# Patient Record
Sex: MALE | Race: WHITE | NOT HISPANIC OR LATINO | Employment: OTHER | URBAN - METROPOLITAN AREA
[De-identification: names, ages, dates, MRNs, and addresses within clinical notes are randomized per-mention and may not be internally consistent; named-entity substitution may affect disease eponyms.]

---

## 2017-03-09 DIAGNOSIS — N18.30 CHRONIC KIDNEY DISEASE, STAGE III (MODERATE): Primary | ICD-10-CM

## 2017-03-09 RX ORDER — SODIUM BICARBONATE 650 MG/1
650 TABLET ORAL 2 TIMES DAILY
Qty: 60 TABLET | Refills: 4 | Status: SHIPPED | OUTPATIENT
Start: 2017-03-09 | End: 2017-08-23 | Stop reason: SDUPTHER

## 2017-05-23 ENCOUNTER — OFFICE VISIT (OUTPATIENT)
Dept: UROLOGY | Facility: CLINIC | Age: 52
End: 2017-05-23
Payer: MEDICARE

## 2017-05-23 ENCOUNTER — HOSPITAL ENCOUNTER (OUTPATIENT)
Dept: RADIOLOGY | Facility: HOSPITAL | Age: 52
Discharge: HOME | End: 2017-05-23
Attending: UROLOGY
Payer: MEDICARE

## 2017-05-23 VITALS
HEART RATE: 79 BPM | BODY MASS INDEX: 24.64 KG/M2 | HEIGHT: 71 IN | SYSTOLIC BLOOD PRESSURE: 118 MMHG | WEIGHT: 176 LBS | DIASTOLIC BLOOD PRESSURE: 78 MMHG

## 2017-05-23 DIAGNOSIS — B20 HIV (HUMAN IMMUNODEFICIENCY VIRUS INFECTION): ICD-10-CM

## 2017-05-23 DIAGNOSIS — N13.30 HYDRONEPHROSIS DETERMINED BY ULTRASOUND: Primary | ICD-10-CM

## 2017-05-23 DIAGNOSIS — N13.30 HYDRONEPHROSIS DETERMINED BY ULTRASOUND: ICD-10-CM

## 2017-05-23 DIAGNOSIS — N20.0 KIDNEY STONE: ICD-10-CM

## 2017-05-23 DIAGNOSIS — N40.0 BENIGN PROSTATIC HYPERPLASIA, PRESENCE OF LOWER URINARY TRACT SYMPTOMS UNSPECIFIED, UNSPECIFIED MORPHOLOGY: ICD-10-CM

## 2017-05-23 LAB
BILIRUB SERPL-MCNC: NORMAL MG/DL
BLOOD URINE, POC: NORMAL
COLOR, POC UA: YELLOW
GLUCOSE UR QL STRIP: NORMAL
KETONES UR QL STRIP: NORMAL
LEUKOCYTE ESTERASE URINE, POC: NORMAL
NITRITE, POC UA: NORMAL
PH, POC UA: 5
PROTEIN, POC: NORMAL
SPECIFIC GRAVITY, POC UA: 1
UROBILINOGEN, POC UA: NORMAL

## 2017-05-23 PROCEDURE — 81002 URINALYSIS NONAUTO W/O SCOPE: CPT | Mod: S$GLB,,, | Performed by: UROLOGY

## 2017-05-23 PROCEDURE — 99999 PR PBB SHADOW E&M-EST. PATIENT-LVL III: CPT | Mod: PBBFAC,,, | Performed by: UROLOGY

## 2017-05-23 PROCEDURE — 74000 XR ABDOMEN AP 1 VIEW: CPT | Mod: 26,,, | Performed by: RADIOLOGY

## 2017-05-23 PROCEDURE — 3078F DIAST BP <80 MM HG: CPT | Mod: S$GLB,,, | Performed by: UROLOGY

## 2017-05-23 PROCEDURE — 1160F RVW MEDS BY RX/DR IN RCRD: CPT | Mod: S$GLB,,, | Performed by: UROLOGY

## 2017-05-23 PROCEDURE — 99214 OFFICE O/P EST MOD 30 MIN: CPT | Mod: 25,S$GLB,, | Performed by: UROLOGY

## 2017-05-23 PROCEDURE — 3074F SYST BP LT 130 MM HG: CPT | Mod: S$GLB,,, | Performed by: UROLOGY

## 2017-05-23 PROCEDURE — 74000 XR ABDOMEN AP 1 VIEW: CPT | Mod: TC

## 2017-05-23 NOTE — PROGRESS NOTES
"Blasie Spear is a 51 y.o. man who is here for the evaluation of kidney problem.  Seen by me for bilateral hydronephrosis with elevated creatinine on 11/20/15.  He is here for follow up.    Hx of childhood kidney surgery.  Hx of kidney stone disease  Hx of HIV  S/p lower back surgery with right lower back pain.  Referred for hydronephrosis bilateral Nephrolithiasis initially.  I evaluated him with MAG 3 renal scan with lasix, which showed no high grade obstruction, and 55 % function on the left kidney and 45 % function on the right kidney.   c/o flank discomfort, takes tramadol for relief  He was evaluated at [facility] in the past for hydronephrosis with negative work up within last 2 years.  Denies hematuria .    He works at [employer] in [employer] ( goes by "Flavio")    Past Medical History:   Diagnosis Date    Anticoagulant long-term use     Asthma     Coronary artery disease Stent 2010    GERD (gastroesophageal reflux disease)     Hypertension     Immune deficiency disorder HIV    Renal disorder only one functioning kidney     Past Surgical History:   Procedure Laterality Date    BACK SURGERY  lamenectomy     2013    CHOLECYSTECTOMY  2008    CORONARY STENT PLACEMENT  2010    FRACTURE SURGERY  foot surgery     Social History   Substance Use Topics    Smoking status: Current Some Day Smoker     Packs/day: 0.50     Years: 25.00     Types: Cigarettes    Smokeless tobacco: Not on file    Alcohol use 2.0 oz/week     4 drink(s) per week     Family History   Problem Relation Age of Onset    Adopted: Yes     Allergy:  Allergies   Allergen Reactions    Penicillins Rash     Outpatient Encounter Prescriptions as of 5/23/2017   Medication Sig Dispense Refill    acetaminophen (TYLENOL) 325 MG tablet Take 325 mg by mouth every 6 (six) hours as needed for Pain.      ANDROGEL 20.25 mg/1.25 gram (1.62 %) GlPm       aripiprazole (ABILIFY) 10 MG Tab       aspirin 81 MG Chew Take 81 mg by mouth once daily.      " clobetasol (TEMOVATE) 0.05 % cream       clonazePAM (KLONOPIN) 1 MG tablet       clopidogrel (PLAVIX) 75 mg tablet       ergocalciferol (ERGOCALCIFEROL) 50,000 unit Cap Take 1 capsule (50,000 Units total) by mouth every 7 days. 12 capsule 3    eszopiclone 3 mg Tab       fenofibrate (TRICOR) 48 MG tablet       fluticasone (FLONASE) 50 mcg/actuation nasal spray       furosemide (LASIX) 20 MG tablet       INCRUSE ELLIPTA 62.5 mcg/actuation DsDv       INTELENCE 200 mg Tab tablet       lisinopril (PRINIVIL,ZESTRIL) 2.5 MG tablet       lisinopril (PRINIVIL,ZESTRIL) 20 MG tablet Take 20 mg by mouth once daily.      loperamide (IMODIUM) 2 mg capsule       metoprolol succinate (TOPROL-XL) 100 MG 24 hr tablet Take 1 tablet (100 mg total) by mouth once daily. 30 tablet 11    montelukast (SINGULAIR) 10 mg tablet Take 10 mg by mouth every evening.      nitroGLYCERIN (NITROSTAT) 0.3 MG SL tablet Place 0.3 mg under the tongue every 5 (five) minutes as needed.      ondansetron (ZOFRAN) 8 MG tablet       PROAIR HFA 90 mcg/actuation inhaler       raltegravir (ISENTRESS) 400 mg tablet Take 400 mg by mouth 2 (two) times daily.      ranitidine (ZANTAC) 300 MG tablet       REYATAZ 200 mg Cap       rosuvastatin (CRESTOR) 10 MG tablet Take 10 mg by mouth once daily.      sodium bicarbonate 650 MG tablet Take 1 tablet (650 mg total) by mouth 2 (two) times daily. 60 tablet 4    SPIRIVA WITH HANDIHALER 18 mcg inhalation capsule       SYMBICORT 80-4.5 mcg/actuation HFAA       tizanidine (ZANAFLEX) 4 MG tablet       topiramate (TOPAMAX) 50 MG tablet       tramadol (ULTRAM) 50 mg tablet Take 50 mg by mouth every 6 (six) hours as needed.      trazodone (DESYREL) 50 MG tablet        No facility-administered encounter medications on file as of 5/23/2017.      Review of Systems   General ROS: GENERAL:  No weight gain or loss  SKIN:  No rashes or lacerations  HEAD:  No headaches  EYES:  No exophthalmos, jaundice or  blindness  EARS:  No dizziness, tinnitus or hearing loss  NOSE:  No changes in smell  MOUTH & THROAT:  No dyskinetic movements or obvious goiter  CHEST:  No shortness of breath, hyperventilation or cough  CARDIOVASCULAR:  No tachycardia or chest pain  ABDOMEN:  No nausea, vomiting, pain, constipation or diarrhea  URINARY:  No frequency, dysuria or sexual dysfunction  ENDOCRINE:  No polydipsia, polyuria  MUSCULOSKELETAL:  No pain or stiffness of the joints  NEUROLOGIC:  No weakness, sensory changes, seizures, confusion, memory loss, tremor or other abnormal movements  Physical Exam     Vitals:    05/23/17 1347   BP: 118/78   Pulse: 79     General Appearance:  Alert, cooperative, no distress, appears stated age   Head:  Normocephalic, without obvious abnormality, atraumatic   Eyes:  PERRL, conjunctiva/corneas clear, EOM's intact, fundi benign, both eyes   Ears:  Normal TM's and external ear canals, both ears   Nose: Nares normal, septum midline, mucosa normal, no drainage or sinus tenderness   Throat: Lips, mucosa, and tongue normal; teeth and gums normal   Neck: Supple, symmetrical, trachea midline, no adenopathy, thyroid: not enlarged, symmetric, no tenderness/mass/nodules, no carotid bruit or JVD   Back:   Symmetric, no curvature, ROM normal, no CVA tenderness   Lungs:   Clear to auscultation bilaterally, respirations unlabored   Chest Wall:  No tenderness or deformity   Heart:  Regular rate and rhythm, S1, S2 normal, no murmur, rub or gallop   Abdomen:   Soft, non-tender, bowel sounds active all four quadrants,  no masses, no organomegaly of liver and spleen  No hernia noted   Genitalia:  Scrotum: no rash or lesion  Normal symmetric epididymis without masses  Normal vas palpated  Normal size, symmetric testicles with no masses   Normal urethral meatus with no discharge  Normal circumcised penis with no lesion   Rectal:  Normal perineum and anus upon inspection.  Normal tone, no masses or tenderness;   Extremities:  Extremities normal, atraumatic, no cyanosis or edema   Pulses: 2+ and symmetric   Skin: Skin color, texture, turgor normal, no rashes or lesions   Lymph nodes: Cervical, supraclavicular, and axillary nodes normal   Neurologic: Normal     Prostate deferred.     LABS:  No results found for: PSA, PSADIAG, PSATOTAL, PSAFREE, PSAFREEPCT    Lab Results   Component Value Date    CREATININE 2.1 (H) 06/22/2016    CREATININE 2.1 (H) 06/22/2016    CREATININE 2.2 (H) 10/20/2015     Radiology:  US 11/13/15  Multifocal areas of scarring in both kidneys. A focal area solid tissue is noted in the region surrounded by scar formation, measuring 2.3 x 1.7 x 2.2 cm. While this likely relates to normal renal tissue, an underlying lesion cannot be entirely excluded and further evaluation with contrast-enhanced CT is recommended.    Bilateral hydronephrosis, moderate on the right and mild on the left. Bilateral ureteral jets are noted, favoring against an obstructing process within the ureter.    Bilateral nonobstructing renal calculi.    Sonographic findings suggestive of medical renal disease.    MAG 3 renal scan with lasix 11/30/2015  1. Scintigraphic evidence of urinary retention in the collecting systems . Preserved response to Lasix with no evidence of high-grade obstruction.    2. Differential function left kidney 55%, right kidney 45%.    Assessment and Plan:  Blaise was seen today for nephrolithiasis.    Diagnoses and all orders for this visit:    Hydronephrosis determined by ultrasound  -     Comprehensive metabolic panel; Future  -     US Retroperitoneal Limited; Future  -     X-Ray Abdomen AP 1 View; Future  -     POCT urine dipstick without microscope    Benign prostatic hyperplasia, presence of lower urinary tract symptoms unspecified, unspecified morphology  -     Prostate Specific Antigen, Diagnostic; Future  -     POCT urine dipstick without microscope    HIV (human immunodeficiency virus infection)  -     Comprehensive  metabolic panel; Future    Kidney stone    follow up renal US and BMP now.  If stable, no need for another MAG 3 renal scan with lasix.  But if any change, will arrange further evaluation.  Will add PSA on his blood test.  If all stable, follow up with me in 1 year.    Recommend to follow up with his nephrology.  I spent 25 minutes with the patient of which more than half was spent in direct consultation with the patient in regards to our treatment and plan.    Follow-up:  Return in about 1 year (around 5/23/2018).

## 2017-05-24 ENCOUNTER — PATIENT MESSAGE (OUTPATIENT)
Dept: UROLOGY | Facility: CLINIC | Age: 52
End: 2017-05-24

## 2017-05-24 ENCOUNTER — TELEPHONE (OUTPATIENT)
Dept: UROLOGY | Facility: CLINIC | Age: 52
End: 2017-05-24

## 2017-05-24 DIAGNOSIS — N20.0 KIDNEY STONE: Primary | ICD-10-CM

## 2017-05-24 RX ORDER — TAMSULOSIN HYDROCHLORIDE 0.4 MG/1
0.4 CAPSULE ORAL DAILY
Qty: 30 CAPSULE | Refills: 11 | Status: SHIPPED | OUTPATIENT
Start: 2017-05-24 | End: 2018-05-24

## 2017-05-24 NOTE — TELEPHONE ENCOUNTER
KUB yesterday showed:    Abdomen single view compared to November 2015.  Postop changes in the spine.  Vascular calcifications noted.  Scattered stool and bowel gas.  Approximate 3 mm calcification overlying the left L3 transverse process.      Diagnoses and all orders for this visit:    Kidney stone  -     CT Renal Stone Study ABD Pelvis WO; Future  -     tamsulosin (FLOMAX) 0.4 mg Cp24; Take 1 capsule (0.4 mg total) by mouth once daily.    Called and talked to his partner about the findings.  Will follow up with CT RSS.  I will review the CT and decide his follow up.  Hydration, flomax for now.

## 2017-06-01 ENCOUNTER — HOSPITAL ENCOUNTER (OUTPATIENT)
Dept: RADIOLOGY | Facility: HOSPITAL | Age: 52
Discharge: HOME | End: 2017-06-01
Attending: UROLOGY
Payer: MEDICARE

## 2017-06-01 DIAGNOSIS — N13.30 HYDRONEPHROSIS DETERMINED BY ULTRASOUND: ICD-10-CM

## 2017-06-01 DIAGNOSIS — N20.0 KIDNEY STONE: ICD-10-CM

## 2017-06-01 PROCEDURE — 74176 CT ABD & PELVIS W/O CONTRAST: CPT | Mod: 26,,, | Performed by: RADIOLOGY

## 2017-06-01 PROCEDURE — 76770 US EXAM ABDO BACK WALL COMP: CPT | Mod: 26,,, | Performed by: RADIOLOGY

## 2017-06-01 PROCEDURE — 76770 US EXAM ABDO BACK WALL COMP: CPT | Mod: TC

## 2017-06-01 PROCEDURE — 74176 CT ABD & PELVIS W/O CONTRAST: CPT | Mod: TC

## 2017-06-07 ENCOUNTER — INITIAL CONSULT (OUTPATIENT)
Dept: OPHTHALMOLOGY | Facility: CLINIC | Age: 52
End: 2017-06-07
Payer: MEDICARE

## 2017-06-07 DIAGNOSIS — H04.123 DRY EYE SYNDROME, BILATERAL: Primary | ICD-10-CM

## 2017-06-07 DIAGNOSIS — H52.7 REFRACTIVE ERROR: ICD-10-CM

## 2017-06-07 PROCEDURE — 99999 PR PBB SHADOW E&M-EST. PATIENT-LVL III: CPT | Mod: PBBFAC,,, | Performed by: OPHTHALMOLOGY

## 2017-06-07 PROCEDURE — 92004 COMPRE OPH EXAM NEW PT 1/>: CPT | Mod: S$GLB,,, | Performed by: OPHTHALMOLOGY

## 2017-06-07 NOTE — LETTER
June 9, 2017      Oksana Sin NP  [id number]  [facility] [address]           [facility] - Ophthalmology  [address]  [facility] [address]  Phone: [phone]  Fax: [phone]          Patient: Blaise Spear   MR Number: [id number]   YOB: 1965   Date of Visit: 6/7/2017       Dear Oksana Sin:    Thank you for referring Blaise Spear to me for evaluation. Attached you will find relevant portions of my assessment and plan of care.    If you have questions, please do not hesitate to call me. I look forward to following Blaise Spear along with you.    Sincerely,    Selena Chávez MD    Enclosure  CC:  No Recipients    If you would like to receive this communication electronically, please contact [email or web address] or [phone] to request more information on [employer] Link access.    For providers and/or their staff who would like to refer a patient to [facility], please contact us through our one-stop-shop provider referral line, [facility], at [phone].    If you feel you have received this communication in error or would no longer like to receive these types of communications, please e-mail [email or web address]

## 2017-06-07 NOTE — PROGRESS NOTES
HPI     DLE:  4 years ago for full exam, 2 years ago at [facility]    Pt states burning and tearing OU x 6 months. Pt states the burning and   tearing happens mostly when reading. Pt states he gets headaches often.    Pt states pain OU (8 on scale).  Pt states floaters OU have gotten worse.   Pt thinks he may need new glasses but wants to get the issues he is   currently having resolved first.     Visine PRN OU (pt states these hurt his eyes when he uses them)  Restasis BID OU (pt states these burn his eyes)     Last edited by Bryanna Clark MA on 6/7/2017  3:20 PM. (History)              Assessment /Plan     For exam results, see Encounter Report.    Dry eye syndrome, bilateral    Refractive error      Discontinue Visine, Recommend ATs 3-4 times daily    Referral to Optometry for refraction and glasses update -  for continued care.

## 2017-06-08 ENCOUNTER — OFFICE VISIT (OUTPATIENT)
Dept: OPTOMETRY | Facility: CLINIC | Age: 52
End: 2017-06-08
Payer: MEDICARE

## 2017-06-08 DIAGNOSIS — H52.4 HYPEROPIA WITH ASTIGMATISM AND PRESBYOPIA, BILATERAL: ICD-10-CM

## 2017-06-08 DIAGNOSIS — H52.03 HYPEROPIA WITH ASTIGMATISM AND PRESBYOPIA, BILATERAL: ICD-10-CM

## 2017-06-08 DIAGNOSIS — H53.8 BLURRED VISION, BILATERAL: Primary | ICD-10-CM

## 2017-06-08 DIAGNOSIS — H04.123 BILATERAL DRY EYES: ICD-10-CM

## 2017-06-08 DIAGNOSIS — H52.203 HYPEROPIA WITH ASTIGMATISM AND PRESBYOPIA, BILATERAL: ICD-10-CM

## 2017-06-08 PROCEDURE — 92015 DETERMINE REFRACTIVE STATE: CPT | Mod: S$GLB,,, | Performed by: OPTOMETRIST

## 2017-06-08 PROCEDURE — 92012 INTRM OPH EXAM EST PATIENT: CPT | Mod: S$GLB,,, | Performed by: OPTOMETRIST

## 2017-06-08 PROCEDURE — 99999 PR PBB SHADOW E&M-EST. PATIENT-LVL II: CPT | Mod: PBBFAC,,, | Performed by: OPTOMETRIST

## 2017-06-08 RX ORDER — LORAZEPAM 1 MG/1
TABLET ORAL
COMMUNITY
Start: 2017-05-31

## 2017-06-08 RX ORDER — NITROGLYCERIN 0.4 MG/1
TABLET SUBLINGUAL
COMMUNITY
Start: 2017-04-24

## 2017-06-08 RX ORDER — EMTRICITABINE 200 MG/1
CAPSULE ORAL
COMMUNITY
Start: 2017-06-01

## 2017-06-08 NOTE — PATIENT INSTRUCTIONS
"DRY EYES     Dry eyes is a common condition. It can be caused by an insufficient volume of tears, or by tears that do not spread evenly over the cornea. An uneven tear film can also cause vision to blur intermittently.   Dry eyes are often associated with burning, stinging, and/or red eyes.As we age, the eyes usually produce fewer tears and result in decreased normal eye lubrication. Paradoxically, dry eye can make eyes water. When they water, that watery production actually makes the dry eye situation worse because the watery tears do not lubricate the eye well at all. Watery tears really serve to flush foreign material out of the eye, not to lubricate. Unfortunately, when the eyes get really dry they become irritated and stimulate the formation of watery tears.   Lubricants, in the form of drops or ointments, are the principal treatment for dry eyes. The following are recommendations for managing your dry eye condition:    1) Use over-the-counter artificial tears or lubricants (such as Systane, Refresh, Blink, or Genteal), 1 drop in each eye 3 to 4 times a day. Please avoid drops that advertise redness relief since these can be unhealthy for your eyes and can cause permanent redness if used long-term.     It is best to take artificial tears on a schedule, like you would for a medication. Taking them routinely at breakfast, lunch, and dinner for example will provide better relief and better use of the tear drop than taking them whenever your eyes "feel" dry.    2) Optional use of over-the-counter lubricating gels (such as Genteal Gel, Systane Gel, or Refresh PM) applied to the inside of the lower lid of both eyes at bedtime. This gel is very thick and may cause blurred vision, so we recommend you use it before bedtime. In the morning you can gently wipe your eyes with a damp washcloth to remove any residual gel.    3) Warm compresses applied to the closed eyelids twice per day, followed with lid massage.    4) " Always drink an adequate amount of water daily (4-6 cups a day).    5) 1000 mg of good quality fish oil (labeled DHA and/or EPA). Flaxseed oil can also be beneficial.    6) Use a humidifier in your bedroom.    7) If the dryness continues there may be additional options of punctal plugs and Restasis. Punctal plugs are medical devices inserted into the puncta or the drain of the eye to block some of your natural tears from draining off the eye. Restasis is a prescription eye drop that, over time, may help your body make more tears naturally.    It is important to maintain this treatment plan until your symptoms have improved. Once improved, you can reduce the frequency of lubricants and warm compresses. If the symptoms recur, then apply as needed for comfort.    Bebe Alvares, GAVIN

## 2017-06-08 NOTE — PROGRESS NOTES
HPI     Mr. Blaise Spear was referred by Dr. Chávez for a refraction.    He saw Dr. Chávez yesterday for dry eyes. He is not currently using any   drops, pt says he never used Visine, he has not yet start artificial tears   recommended by Dr. Chávez.  Patient complains of blurry vision all ranges with his current trifocal   lenses.    (-)drops  (-)flashes  (-)floaters  (-)diplopia    Diabetic no  No results found for: HGBA1C    OCULAR HISTORY  Last Eye Exam 06/08/2017  (-)eye surgery   Dry eyes    FAMILY HISTORY  (-)Glaucoma     Last edited by Bebe Alvares, OD on 6/8/2017 12:28 PM. (History)            Assessment /Plan     For exam results, see Encounter Report.    Blurred vision, bilateral   Caused by under-corrected refractive error. See plan below.    Hyperopia with astigmatism and presbyopia, bilateral   Increase in refractive error OU. New glasses prescription released, adaptation expected.    Eyeglass Final Rx     Eyeglass Final Rx       Sphere Cylinder Axis Dist VA Add    Right +1.75 +0.75 180 20/20 +2.00    Left +1.75 Sphere  20/20 +2.00    Expiration Date:  6/9/2018    Comments:  Tint okay.            Bilateral dry eyes   Start artificial tears TID as directed by Dr. Chávez      RTC prn  Next comprehensive eye exam with DFE due in 1 year (June 2018)

## 2017-08-21 ENCOUNTER — TELEPHONE (OUTPATIENT)
Dept: NEUROSURGERY | Facility: CLINIC | Age: 52
End: 2017-08-21

## 2017-08-21 NOTE — TELEPHONE ENCOUNTER
"----- Message from Shannon Cain sent at 8/21/2017 11:40 AM CDT -----  Contact: Anne with [facility] [phone] ext. [phone]  Anne is calling to schedule a new patient appt for "Ongoing lumbar and thoracic pain", patient has recent MRI. Please call   "

## 2017-08-21 NOTE — TELEPHONE ENCOUNTER
SCHEDULED APPT FOR 09/20/2017 AT 1530 AT [facility] WITH INSTRUCTIONS TO BRING MRI DISK AND REPORT TO THE APPT

## 2017-08-23 DIAGNOSIS — N18.30 CHRONIC KIDNEY DISEASE, STAGE III (MODERATE): ICD-10-CM

## 2017-08-23 NOTE — TELEPHONE ENCOUNTER
----- Message from Yaima López sent at 8/23/2017  4:05 PM CDT -----  Contact: [facility]  OUT OF MED    BEEN OUT OF MED --    Electronically  Sent      [facility]   [phone] -        Sodium Bicarb     [facility]      Thanks

## 2017-08-24 RX ORDER — SODIUM BICARBONATE 650 MG/1
650 TABLET ORAL 2 TIMES DAILY
Qty: 60 TABLET | Refills: 4 | Status: SHIPPED | OUTPATIENT
Start: 2017-08-24 | End: 2018-04-26

## 2018-03-14 ENCOUNTER — PATIENT MESSAGE (OUTPATIENT)
Dept: NEPHROLOGY | Facility: CLINIC | Age: 53
End: 2018-03-14

## 2018-03-14 DIAGNOSIS — N18.30 CHRONIC KIDNEY DISEASE, STAGE III (MODERATE): ICD-10-CM

## 2018-03-14 RX ORDER — SODIUM BICARBONATE 650 MG/1
650 TABLET ORAL 2 TIMES DAILY
Qty: 60 TABLET | Refills: 4 | OUTPATIENT
Start: 2018-03-14 | End: 2018-04-13

## 2018-03-14 RX ORDER — ERGOCALCIFEROL 1.25 MG/1
50000 CAPSULE ORAL
Qty: 12 CAPSULE | Refills: 3 | OUTPATIENT
Start: 2018-03-14

## 2018-03-15 DIAGNOSIS — N18.30 CHRONIC KIDNEY DISEASE, STAGE III (MODERATE): Primary | ICD-10-CM

## 2018-04-20 ENCOUNTER — LAB VISIT (OUTPATIENT)
Dept: LAB | Facility: HOSPITAL | Age: 53
End: 2018-04-20
Attending: INTERNAL MEDICINE
Payer: MEDICARE

## 2018-04-20 DIAGNOSIS — N18.30 CHRONIC KIDNEY DISEASE, STAGE III (MODERATE): ICD-10-CM

## 2018-04-20 LAB
ALBUMIN SERPL BCP-MCNC: 4 G/DL
ANION GAP SERPL CALC-SCNC: 8 MMOL/L
BASOPHILS # BLD AUTO: 0.04 K/UL
BASOPHILS NFR BLD: 0.6 %
BUN SERPL-MCNC: 19 MG/DL
CALCIUM SERPL-MCNC: 9.6 MG/DL
CHLORIDE SERPL-SCNC: 108 MMOL/L
CO2 SERPL-SCNC: 27 MMOL/L
CREAT SERPL-MCNC: 1.5 MG/DL
DIFFERENTIAL METHOD: NORMAL
EOSINOPHIL # BLD AUTO: 0.1 K/UL
EOSINOPHIL NFR BLD: 1.4 %
ERYTHROCYTE [DISTWIDTH] IN BLOOD BY AUTOMATED COUNT: 13.1 %
EST. GFR  (AFRICAN AMERICAN): >60 ML/MIN/1.73 M^2
EST. GFR  (NON AFRICAN AMERICAN): 52.4 ML/MIN/1.73 M^2
GLUCOSE SERPL-MCNC: 98 MG/DL
HCT VFR BLD AUTO: 44.3 %
HGB BLD-MCNC: 14.6 G/DL
IMM GRANULOCYTES # BLD AUTO: 0.03 K/UL
IMM GRANULOCYTES NFR BLD AUTO: 0.4 %
LYMPHOCYTES # BLD AUTO: 2.4 K/UL
LYMPHOCYTES NFR BLD: 32.7 %
MCH RBC QN AUTO: 29.7 PG
MCHC RBC AUTO-ENTMCNC: 33 G/DL
MCV RBC AUTO: 90 FL
MONOCYTES # BLD AUTO: 0.5 K/UL
MONOCYTES NFR BLD: 6.2 %
NEUTROPHILS # BLD AUTO: 4.3 K/UL
NEUTROPHILS NFR BLD: 58.7 %
NRBC BLD-RTO: 0 /100 WBC
PHOSPHATE SERPL-MCNC: 2.5 MG/DL
PLATELET # BLD AUTO: 176 K/UL
PMV BLD AUTO: 10.2 FL
POTASSIUM SERPL-SCNC: 3.9 MMOL/L
RBC # BLD AUTO: 4.91 M/UL
SODIUM SERPL-SCNC: 143 MMOL/L
WBC # BLD AUTO: 7.27 K/UL

## 2018-04-20 PROCEDURE — 80069 RENAL FUNCTION PANEL: CPT

## 2018-04-20 PROCEDURE — 36415 COLL VENOUS BLD VENIPUNCTURE: CPT

## 2018-04-20 PROCEDURE — 85025 COMPLETE CBC W/AUTO DIFF WBC: CPT

## 2018-04-26 ENCOUNTER — OFFICE VISIT (OUTPATIENT)
Dept: NEPHROLOGY | Facility: CLINIC | Age: 53
End: 2018-04-26
Payer: MEDICARE

## 2018-04-26 VITALS
WEIGHT: 181.44 LBS | HEART RATE: 77 BPM | BODY MASS INDEX: 25.4 KG/M2 | OXYGEN SATURATION: 97 % | DIASTOLIC BLOOD PRESSURE: 80 MMHG | SYSTOLIC BLOOD PRESSURE: 92 MMHG | HEIGHT: 71 IN

## 2018-04-26 DIAGNOSIS — N18.30 CKD (CHRONIC KIDNEY DISEASE), STAGE III: Primary | ICD-10-CM

## 2018-04-26 DIAGNOSIS — I10 ESSENTIAL HYPERTENSION: ICD-10-CM

## 2018-04-26 DIAGNOSIS — R33.8 BENIGN PROSTATIC HYPERPLASIA WITH URINARY RETENTION: ICD-10-CM

## 2018-04-26 DIAGNOSIS — N13.30 HYDRONEPHROSIS DETERMINED BY ULTRASOUND: ICD-10-CM

## 2018-04-26 DIAGNOSIS — N40.1 BENIGN PROSTATIC HYPERPLASIA WITH URINARY RETENTION: ICD-10-CM

## 2018-04-26 PROCEDURE — 99213 OFFICE O/P EST LOW 20 MIN: CPT | Mod: S$GLB,,, | Performed by: INTERNAL MEDICINE

## 2018-04-26 PROCEDURE — 3079F DIAST BP 80-89 MM HG: CPT | Mod: CPTII,S$GLB,, | Performed by: INTERNAL MEDICINE

## 2018-04-26 PROCEDURE — 3074F SYST BP LT 130 MM HG: CPT | Mod: CPTII,S$GLB,, | Performed by: INTERNAL MEDICINE

## 2018-04-26 PROCEDURE — 99999 PR PBB SHADOW E&M-EST. PATIENT-LVL V: CPT | Mod: PBBFAC,,, | Performed by: INTERNAL MEDICINE

## 2018-04-26 RX ORDER — LISINOPRIL 5 MG/1
5 TABLET ORAL DAILY
COMMUNITY
Start: 2018-04-03

## 2018-04-26 RX ORDER — SUVOREXANT 20 MG/1
TABLET, FILM COATED ORAL
COMMUNITY
Start: 2018-04-13

## 2018-04-26 NOTE — PROGRESS NOTES
Subjective:       Patient ID: Blaise Spear is a 52 y.o. White male who presents for new evaluation of CKD  The patient has a history of CKD III for the last couple of yrs. He also has a hx of CAD and HIV (undetectable viral load and excellent CD4 count >1000 on HAART, but checked last in February). No hx of HTN or diabetes.  The patient was on Tenofovir in the past and has been switched 2 yrs ago, his creatinine has been stable with a creatinine being 2.2 (5/16/16). He was also on atazanavir 2014 and was discontinued after we found typical crystals in the urine. He is also on Topiramate for his migraines. His US from 20/16 from last did demonstrate bilateral hydronephrosis (wiht confirmed mild obstruction in his MG3 scan)  and he was seen by Dr. Quarles urology who started him on Flomax. His most recent creatinine is greatly improved at 1.5mg/dl. He reported having the flu really bad a month ago.     HPI  Review of Systems   Constitutional: Negative.    HENT: Negative.    Eyes: Negative.    Respiratory: Negative.    Cardiovascular: Negative.    Gastrointestinal: Negative.  Negative for diarrhea, nausea and vomiting.   Genitourinary: Negative for decreased urine volume, difficulty urinating, dysuria, hematuria and urgency. Flank pain: x 2 month.   Musculoskeletal: Negative.    Skin: Negative.    Neurological: Negative.    Psychiatric/Behavioral: Negative.        Objective:      Physical Exam   Constitutional: He is oriented to person, place, and time. He appears well-developed and well-nourished.   HENT:   Head: Normocephalic and atraumatic.   Right Ear: External ear normal.   Left Ear: External ear normal.   Nose: Nose normal.   Mouth/Throat: Oropharynx is clear and moist.   Eyes: Conjunctivae and EOM are normal. Pupils are equal, round, and reactive to light.   Neck: Normal range of motion. Neck supple. No JVD present.   Cardiovascular: Normal rate, regular rhythm, normal heart sounds and intact distal pulses.     Pulmonary/Chest: Effort normal and breath sounds normal. No stridor. No respiratory distress. He has no rales. He exhibits no tenderness.   Abdominal: Soft. Bowel sounds are normal. He exhibits no distension and no mass. There is no tenderness. There is no rebound and no guarding.   Musculoskeletal: Normal range of motion.   Lymphadenopathy:     He has no cervical adenopathy.   Neurological: He is alert and oriented to person, place, and time. He has normal reflexes. No cranial nerve deficit. Coordination normal.   Skin: Skin is warm and dry.   Psychiatric: He has a normal mood and affect. His behavior is normal. Judgment and thought content normal.   Nursing note and vitals reviewed.      Assessment:       1. CKD (chronic kidney disease), stage III    2. Essential hypertension    3. Hydronephrosis determined by ultrasound    4. Benign prostatic hyperplasia with urinary retention        Plan:     52 y.o. y/o patient with a history of DM, HTN and CKD is here for f/u.    1. CKD 3: stable: creatinine improved with no evidence of significant proteinuria or hematuria. He is doing quite well clinically.  CKD thought to be secondary to Tenofovir he took many years in the past. Possibly Atazanavir crystals contributing( ot clear if that had impact on his kidney function). US demonstrated stones and hydronephrosis (from atazanavir)    - patient still has a low CO2, likely due to RTA, likely secondary to his topiramate. He stopped it and his acidosis resolved.   off the bicarb.    Lab Results   Component Value Date    CREATININE 1.5 (H) 04/20/2018     2. HTN: Blood pressures well controlled  - on lisinopril to 10mg po qd.   He is taking a fluid pill which is not in the medlist    3. Renal osteodystrophy:no updated PTH, on Ergocalciferol 41136 units weekly - take ones a month.   Lab Results   Component Value Date    PTH 50.0 06/22/2016    CALCIUM 9.6 04/20/2018    PHOS 2.5 (L) 04/20/2018     4. Anemia: improved    5. CAD:  s/p stent placement, recent negative stress test.     Follow up with his ID physician    See me back in 8 month with labs and renal US

## 2019-12-05 DIAGNOSIS — N18.30 CKD (CHRONIC KIDNEY DISEASE), STAGE III: Primary | ICD-10-CM

## 2019-12-05 DIAGNOSIS — I10 ESSENTIAL HYPERTENSION: ICD-10-CM

## 2019-12-24 ENCOUNTER — TELEPHONE (OUTPATIENT)
Dept: NEPHROLOGY | Facility: CLINIC | Age: 54
End: 2019-12-24

## 2020-02-05 ENCOUNTER — LAB VISIT (OUTPATIENT)
Dept: LAB | Facility: HOSPITAL | Age: 55
End: 2020-02-05
Attending: INTERNAL MEDICINE
Payer: MEDICARE

## 2020-02-05 DIAGNOSIS — N18.30 CKD (CHRONIC KIDNEY DISEASE), STAGE III: ICD-10-CM

## 2020-02-05 DIAGNOSIS — I10 ESSENTIAL HYPERTENSION: ICD-10-CM

## 2020-02-05 LAB
BACTERIA #/AREA URNS AUTO: NORMAL /HPF
BILIRUB UR QL STRIP: NEGATIVE
CLARITY UR REFRACT.AUTO: CLEAR
COLOR UR AUTO: YELLOW
CREAT UR-MCNC: 148 MG/DL (ref 23–375)
GLUCOSE UR QL STRIP: NEGATIVE
HGB UR QL STRIP: NEGATIVE
HYALINE CASTS UR QL AUTO: 0 /LPF
KETONES UR QL STRIP: NEGATIVE
LEUKOCYTE ESTERASE UR QL STRIP: NEGATIVE
MICROSCOPIC COMMENT: NORMAL
NITRITE UR QL STRIP: NEGATIVE
PH UR STRIP: 5 [PH] (ref 5–8)
PROT UR QL STRIP: ABNORMAL
PROT UR-MCNC: 48 MG/DL (ref 0–15)
PROT/CREAT UR: 0.32 MG/G{CREAT} (ref 0–0.2)
RBC #/AREA URNS AUTO: 0 /HPF (ref 0–4)
SP GR UR STRIP: 1.01 (ref 1–1.03)
URN SPEC COLLECT METH UR: ABNORMAL
WBC #/AREA URNS AUTO: 0 /HPF (ref 0–5)

## 2020-02-05 PROCEDURE — 81001 URINALYSIS AUTO W/SCOPE: CPT

## 2020-02-05 PROCEDURE — 82570 ASSAY OF URINE CREATININE: CPT

## 2020-02-12 ENCOUNTER — OFFICE VISIT (OUTPATIENT)
Dept: NEPHROLOGY | Facility: CLINIC | Age: 55
End: 2020-02-12
Payer: MEDICARE

## 2020-02-12 VITALS
HEIGHT: 71 IN | SYSTOLIC BLOOD PRESSURE: 130 MMHG | HEART RATE: 78 BPM | BODY MASS INDEX: 26.21 KG/M2 | OXYGEN SATURATION: 98 % | DIASTOLIC BLOOD PRESSURE: 78 MMHG | WEIGHT: 187.19 LBS

## 2020-02-12 DIAGNOSIS — I10 ESSENTIAL HYPERTENSION: ICD-10-CM

## 2020-02-12 DIAGNOSIS — N20.0 KIDNEY STONE: ICD-10-CM

## 2020-02-12 DIAGNOSIS — Z21 ASYMPTOMATIC HIV INFECTION: ICD-10-CM

## 2020-02-12 DIAGNOSIS — N18.30 CKD (CHRONIC KIDNEY DISEASE), STAGE III: Primary | ICD-10-CM

## 2020-02-12 PROCEDURE — 99213 PR OFFICE/OUTPT VISIT, EST, LEVL III, 20-29 MIN: ICD-10-PCS | Mod: S$GLB,,, | Performed by: INTERNAL MEDICINE

## 2020-02-12 PROCEDURE — 3078F PR MOST RECENT DIASTOLIC BLOOD PRESSURE < 80 MM HG: ICD-10-PCS | Mod: CPTII,S$GLB,, | Performed by: INTERNAL MEDICINE

## 2020-02-12 PROCEDURE — 3075F SYST BP GE 130 - 139MM HG: CPT | Mod: CPTII,S$GLB,, | Performed by: INTERNAL MEDICINE

## 2020-02-12 PROCEDURE — 3008F BODY MASS INDEX DOCD: CPT | Mod: CPTII,S$GLB,, | Performed by: INTERNAL MEDICINE

## 2020-02-12 PROCEDURE — 99213 OFFICE O/P EST LOW 20 MIN: CPT | Mod: S$GLB,,, | Performed by: INTERNAL MEDICINE

## 2020-02-12 PROCEDURE — 99999 PR PBB SHADOW E&M-EST. PATIENT-LVL III: CPT | Mod: PBBFAC,,, | Performed by: INTERNAL MEDICINE

## 2020-02-12 PROCEDURE — 3078F DIAST BP <80 MM HG: CPT | Mod: CPTII,S$GLB,, | Performed by: INTERNAL MEDICINE

## 2020-02-12 PROCEDURE — 99999 PR PBB SHADOW E&M-EST. PATIENT-LVL III: ICD-10-PCS | Mod: PBBFAC,,, | Performed by: INTERNAL MEDICINE

## 2020-02-12 PROCEDURE — 3008F PR BODY MASS INDEX (BMI) DOCUMENTED: ICD-10-PCS | Mod: CPTII,S$GLB,, | Performed by: INTERNAL MEDICINE

## 2020-02-12 PROCEDURE — 3075F PR MOST RECENT SYSTOLIC BLOOD PRESS GE 130-139MM HG: ICD-10-PCS | Mod: CPTII,S$GLB,, | Performed by: INTERNAL MEDICINE

## 2020-02-12 RX ORDER — EMTRICITABINE, RILPIVIRINE HYDROCHLORIDE, AND TENOFOVIR ALAFENAMIDE 200; 25; 25 MG/1; MG/1; MG/1
200 TABLET ORAL DAILY
COMMUNITY
Start: 2020-02-05

## 2020-02-12 RX ORDER — DOLUTEGRAVIR SODIUM 50 MG/1
50 TABLET, FILM COATED ORAL DAILY
COMMUNITY
Start: 2020-02-01

## 2020-02-12 NOTE — PROGRESS NOTES
Subjective:       Patient ID: Blaise Spear is a 54 y.o. White male who presents for new evaluation of CKD  The patient has a history of CKD III for the last couple of yrs. He also has a hx of CAD and HIV (undetectable viral load and excellent CD4 count >1000 on HAART, but checked last in February). No hx of HTN or diabetes.  The patient was on Tenofovir in the past and has been switched 2 yrs ago, his creatinine has been stable with a creatinine being 2.2 (5/16/16). He was also on atazanavir 2014 and was discontinued after we found typical crystals in the urine. He is also on Topiramate for his migraines. His US from 20/16 from last did demonstrate bilateral hydronephrosis (wiht confirmed mild obstruction in his MG3 scan)  and he was seen by Dr. Quarles urology who started him on Flomax. His most recent creatinine is stable at 2.0mg/dl.. He reported having the flu really bad a month ago.   Complains about bilateral flank pain in the afternoon hours.     HPI  Review of Systems   Constitutional: Negative.    HENT: Negative.    Eyes: Negative.    Respiratory: Negative.    Cardiovascular: Negative.    Gastrointestinal: Negative.  Negative for diarrhea, nausea and vomiting.   Genitourinary: Negative for decreased urine volume, difficulty urinating, dysuria, hematuria and urgency. Flank pain: x 2 month.   Musculoskeletal: Negative.    Skin: Negative.    Neurological: Negative.    Psychiatric/Behavioral: Negative.        Objective:      Physical Exam   Constitutional: He is oriented to person, place, and time. He appears well-developed and well-nourished.   HENT:   Head: Normocephalic and atraumatic.   Right Ear: External ear normal.   Left Ear: External ear normal.   Nose: Nose normal.   Mouth/Throat: Oropharynx is clear and moist.   Eyes: Pupils are equal, round, and reactive to light. Conjunctivae and EOM are normal.   Neck: Normal range of motion. Neck supple. No JVD present.   Cardiovascular: Normal rate, regular  rhythm, normal heart sounds and intact distal pulses.   Pulmonary/Chest: Effort normal and breath sounds normal. No stridor. No respiratory distress. He has no rales. He exhibits no tenderness.   Abdominal: Soft. Bowel sounds are normal. He exhibits no distension and no mass. There is no tenderness. There is no rebound and no guarding.   Musculoskeletal: Normal range of motion.   Lymphadenopathy:     He has no cervical adenopathy.   Neurological: He is alert and oriented to person, place, and time. He has normal reflexes. No cranial nerve deficit. Coordination normal.   Skin: Skin is warm and dry.   Psychiatric: He has a normal mood and affect. His behavior is normal. Judgment and thought content normal.   Nursing note and vitals reviewed.      Assessment:       1. CKD (chronic kidney disease), stage III    2. Kidney stone    3. Asymptomatic HIV infection    4. Essential hypertension        Plan:     54 y.o. y/o patient with a history of DM, HTN and CKD is here for f/u.    1. CKD 3: stable: creatinine improved with no evidence of significant proteinuria or hematuria. He is doing quite well clinically.  CKD thought to be secondary to Tenofovir he took many years in the past. Possibly Atazanavir crystals had been contributing in the past.   US demonstrated stones and hydronephrosis (from atazanavir) in the past.  He is on tenofovir alafenamid now which is supposed to be not as nephrotoxic than the prior tenofovir formulation.    - patient still had a low CO2, likely due to RTA, likely secondary to his topiramate. He stopped it and his acidosis resolved.   off the bicarb.     - will get renal US to exclude obstruction  - would strongly consider alternative HIV medication if his creatinine continues to increase.     Lab Results   Component Value Date    CREATININE 2.1 (H) 02/05/2020     2. HTN: Blood pressures well controlled  - on lisinopril to 10mg po qd.   He is taking a fluid pill which is not in the medlist    3.  Renal osteodystrophy:no updated PTH, on Ergocalciferol 19879 units weekly - take ones a month.   Lab Results   Component Value Date    PTH 54.0 02/05/2020    CALCIUM 9.8 02/05/2020    PHOS 2.7 02/05/2020     4. Anemia: improved    5. CAD: s/p stent placement, recent negative stress test. Follow up with cardiology    Follow up with his ID physician    See me back in 8 month with labs and renal US now

## 2020-03-11 ENCOUNTER — HOSPITAL ENCOUNTER (OUTPATIENT)
Dept: RADIOLOGY | Facility: HOSPITAL | Age: 55
Discharge: HOME | End: 2020-03-11
Attending: INTERNAL MEDICINE
Payer: MEDICARE

## 2020-03-11 ENCOUNTER — PATIENT MESSAGE (OUTPATIENT)
Dept: NEPHROLOGY | Facility: CLINIC | Age: 55
End: 2020-03-11

## 2020-03-11 DIAGNOSIS — N18.30 CKD (CHRONIC KIDNEY DISEASE), STAGE III: ICD-10-CM

## 2020-03-11 DIAGNOSIS — N18.30 CKD (CHRONIC KIDNEY DISEASE), STAGE III: Primary | ICD-10-CM

## 2020-03-11 DIAGNOSIS — N20.0 KIDNEY STONE: ICD-10-CM

## 2020-03-11 PROCEDURE — 76770 US EXAM ABDO BACK WALL COMP: CPT | Mod: 26,,, | Performed by: RADIOLOGY

## 2020-03-11 PROCEDURE — 76770 US EXAM ABDO BACK WALL COMP: CPT | Mod: TC

## 2020-03-11 PROCEDURE — 76770 US RETROPERITONEAL COMPLETE: ICD-10-PCS | Mod: 26,,, | Performed by: RADIOLOGY

## 2020-03-12 ENCOUNTER — TELEPHONE (OUTPATIENT)
Dept: NEPHROLOGY | Facility: CLINIC | Age: 55
End: 2020-03-12

## 2021-04-26 ENCOUNTER — PATIENT MESSAGE (OUTPATIENT)
Dept: RESEARCH | Facility: HOSPITAL | Age: 56
End: 2021-04-26